# Patient Record
Sex: FEMALE | Race: WHITE | URBAN - METROPOLITAN AREA
[De-identification: names, ages, dates, MRNs, and addresses within clinical notes are randomized per-mention and may not be internally consistent; named-entity substitution may affect disease eponyms.]

---

## 2021-02-25 ENCOUNTER — INPATIENT (INPATIENT)
Facility: HOSPITAL | Age: 56
LOS: 0 days | Discharge: HOME | DRG: 247 | End: 2021-02-26
Attending: FAMILY MEDICINE | Admitting: INTERNAL MEDICINE
Payer: COMMERCIAL

## 2021-02-25 VITALS
DIASTOLIC BLOOD PRESSURE: 88 MMHG | SYSTOLIC BLOOD PRESSURE: 155 MMHG | RESPIRATION RATE: 18 BRPM | WEIGHT: 250 LBS | HEIGHT: 63 IN | HEART RATE: 86 BPM | OXYGEN SATURATION: 98 % | TEMPERATURE: 99 F

## 2021-02-25 DIAGNOSIS — R07.9 CHEST PAIN, UNSPECIFIED: ICD-10-CM

## 2021-02-25 LAB
ALBUMIN SERPL ELPH-MCNC: 3.8 G/DL — SIGNIFICANT CHANGE UP (ref 3.3–5)
ALP SERPL-CCNC: 70 U/L — SIGNIFICANT CHANGE UP (ref 40–120)
ALT FLD-CCNC: 24 U/L — SIGNIFICANT CHANGE UP (ref 12–78)
ANION GAP SERPL CALC-SCNC: 7 MMOL/L — SIGNIFICANT CHANGE UP (ref 5–17)
APTT BLD: 35.5 SEC — SIGNIFICANT CHANGE UP (ref 27.5–35.5)
AST SERPL-CCNC: 15 U/L — SIGNIFICANT CHANGE UP (ref 15–37)
BASOPHILS # BLD AUTO: 0.08 K/UL — SIGNIFICANT CHANGE UP (ref 0–0.2)
BASOPHILS NFR BLD AUTO: 0.8 % — SIGNIFICANT CHANGE UP (ref 0–2)
BILIRUB SERPL-MCNC: 0.4 MG/DL — SIGNIFICANT CHANGE UP (ref 0.2–1.2)
BUN SERPL-MCNC: 13 MG/DL — SIGNIFICANT CHANGE UP (ref 7–23)
CALCIUM SERPL-MCNC: 9.7 MG/DL — SIGNIFICANT CHANGE UP (ref 8.5–10.1)
CHLORIDE SERPL-SCNC: 104 MMOL/L — SIGNIFICANT CHANGE UP (ref 96–108)
CO2 SERPL-SCNC: 27 MMOL/L — SIGNIFICANT CHANGE UP (ref 22–31)
CREAT SERPL-MCNC: 1 MG/DL — SIGNIFICANT CHANGE UP (ref 0.5–1.3)
EOSINOPHIL # BLD AUTO: 0.26 K/UL — SIGNIFICANT CHANGE UP (ref 0–0.5)
EOSINOPHIL NFR BLD AUTO: 2.7 % — SIGNIFICANT CHANGE UP (ref 0–6)
GLUCOSE SERPL-MCNC: 157 MG/DL — HIGH (ref 70–99)
HCT VFR BLD CALC: 46.4 % — HIGH (ref 34.5–45)
HGB BLD-MCNC: 15.2 G/DL — SIGNIFICANT CHANGE UP (ref 11.5–15.5)
IMM GRANULOCYTES NFR BLD AUTO: 0.2 % — SIGNIFICANT CHANGE UP (ref 0–1.5)
INR BLD: 1.07 RATIO — SIGNIFICANT CHANGE UP (ref 0.88–1.16)
LYMPHOCYTES # BLD AUTO: 2.26 K/UL — SIGNIFICANT CHANGE UP (ref 1–3.3)
LYMPHOCYTES # BLD AUTO: 23.4 % — SIGNIFICANT CHANGE UP (ref 13–44)
MCHC RBC-ENTMCNC: 28.3 PG — SIGNIFICANT CHANGE UP (ref 27–34)
MCHC RBC-ENTMCNC: 32.8 GM/DL — SIGNIFICANT CHANGE UP (ref 32–36)
MCV RBC AUTO: 86.2 FL — SIGNIFICANT CHANGE UP (ref 80–100)
MONOCYTES # BLD AUTO: 0.66 K/UL — SIGNIFICANT CHANGE UP (ref 0–0.9)
MONOCYTES NFR BLD AUTO: 6.8 % — SIGNIFICANT CHANGE UP (ref 2–14)
NEUTROPHILS # BLD AUTO: 6.38 K/UL — SIGNIFICANT CHANGE UP (ref 1.8–7.4)
NEUTROPHILS NFR BLD AUTO: 66.1 % — SIGNIFICANT CHANGE UP (ref 43–77)
PLATELET # BLD AUTO: 286 K/UL — SIGNIFICANT CHANGE UP (ref 150–400)
POTASSIUM SERPL-MCNC: 4.4 MMOL/L — SIGNIFICANT CHANGE UP (ref 3.5–5.3)
POTASSIUM SERPL-SCNC: 4.4 MMOL/L — SIGNIFICANT CHANGE UP (ref 3.5–5.3)
PROT SERPL-MCNC: 7.7 GM/DL — SIGNIFICANT CHANGE UP (ref 6–8.3)
PROTHROM AB SERPL-ACNC: 12.5 SEC — SIGNIFICANT CHANGE UP (ref 10.6–13.6)
RBC # BLD: 5.38 M/UL — HIGH (ref 3.8–5.2)
RBC # FLD: 13.7 % — SIGNIFICANT CHANGE UP (ref 10.3–14.5)
SARS-COV-2 RNA SPEC QL NAA+PROBE: SIGNIFICANT CHANGE UP
SODIUM SERPL-SCNC: 138 MMOL/L — SIGNIFICANT CHANGE UP (ref 135–145)
TROPONIN I SERPL-MCNC: 0.08 NG/ML — HIGH (ref 0.01–0.04)
WBC # BLD: 9.66 K/UL — SIGNIFICANT CHANGE UP (ref 3.8–10.5)
WBC # FLD AUTO: 9.66 K/UL — SIGNIFICANT CHANGE UP (ref 3.8–10.5)

## 2021-02-25 PROCEDURE — 82962 GLUCOSE BLOOD TEST: CPT

## 2021-02-25 PROCEDURE — C1894: CPT

## 2021-02-25 PROCEDURE — 99223 1ST HOSP IP/OBS HIGH 75: CPT

## 2021-02-25 PROCEDURE — 93005 ELECTROCARDIOGRAM TRACING: CPT

## 2021-02-25 PROCEDURE — 99153 MOD SED SAME PHYS/QHP EA: CPT

## 2021-02-25 PROCEDURE — 93010 ELECTROCARDIOGRAM REPORT: CPT | Mod: 76

## 2021-02-25 PROCEDURE — 80048 BASIC METABOLIC PNL TOTAL CA: CPT

## 2021-02-25 PROCEDURE — 71045 X-RAY EXAM CHEST 1 VIEW: CPT | Mod: 26

## 2021-02-25 PROCEDURE — 71045 X-RAY EXAM CHEST 1 VIEW: CPT

## 2021-02-25 PROCEDURE — C1725: CPT

## 2021-02-25 PROCEDURE — C1769: CPT

## 2021-02-25 PROCEDURE — C1874: CPT

## 2021-02-25 PROCEDURE — 86850 RBC ANTIBODY SCREEN: CPT

## 2021-02-25 PROCEDURE — 93306 TTE W/DOPPLER COMPLETE: CPT

## 2021-02-25 PROCEDURE — 36415 COLL VENOUS BLD VENIPUNCTURE: CPT

## 2021-02-25 PROCEDURE — 85025 COMPLETE CBC W/AUTO DIFF WBC: CPT

## 2021-02-25 PROCEDURE — 80061 LIPID PANEL: CPT

## 2021-02-25 PROCEDURE — 90686 IIV4 VACC NO PRSV 0.5 ML IM: CPT

## 2021-02-25 PROCEDURE — 86901 BLOOD TYPING SEROLOGIC RH(D): CPT

## 2021-02-25 PROCEDURE — 86900 BLOOD TYPING SEROLOGIC ABO: CPT

## 2021-02-25 PROCEDURE — 99152 MOD SED SAME PHYS/QHP 5/>YRS: CPT

## 2021-02-25 PROCEDURE — 86803 HEPATITIS C AB TEST: CPT

## 2021-02-25 PROCEDURE — C1887: CPT

## 2021-02-25 PROCEDURE — C1760: CPT

## 2021-02-25 PROCEDURE — G0008: CPT

## 2021-02-25 PROCEDURE — 83036 HEMOGLOBIN GLYCOSYLATED A1C: CPT

## 2021-02-25 RX ORDER — ATORVASTATIN CALCIUM 80 MG/1
20 TABLET, FILM COATED ORAL AT BEDTIME
Refills: 0 | Status: DISCONTINUED | OUTPATIENT
Start: 2021-02-25 | End: 2021-02-25

## 2021-02-25 RX ORDER — ONDANSETRON 8 MG/1
4 TABLET, FILM COATED ORAL EVERY 6 HOURS
Refills: 0 | Status: DISCONTINUED | OUTPATIENT
Start: 2021-02-25 | End: 2021-02-26

## 2021-02-25 RX ORDER — METOPROLOL TARTRATE 50 MG
25 TABLET ORAL DAILY
Refills: 0 | Status: DISCONTINUED | OUTPATIENT
Start: 2021-02-25 | End: 2021-02-26

## 2021-02-25 RX ORDER — ATORVASTATIN CALCIUM 80 MG/1
1 TABLET, FILM COATED ORAL
Qty: 0 | Refills: 0 | DISCHARGE

## 2021-02-25 RX ORDER — LANOLIN ALCOHOL/MO/W.PET/CERES
3 CREAM (GRAM) TOPICAL ONCE
Refills: 0 | Status: COMPLETED | OUTPATIENT
Start: 2021-02-25 | End: 2021-02-25

## 2021-02-25 RX ORDER — INSULIN LISPRO 100/ML
VIAL (ML) SUBCUTANEOUS
Refills: 0 | Status: DISCONTINUED | OUTPATIENT
Start: 2021-02-25 | End: 2021-02-26

## 2021-02-25 RX ORDER — DEXTROSE 50 % IN WATER 50 %
25 SYRINGE (ML) INTRAVENOUS ONCE
Refills: 0 | Status: DISCONTINUED | OUTPATIENT
Start: 2021-02-25 | End: 2021-02-26

## 2021-02-25 RX ORDER — DEXTROSE 50 % IN WATER 50 %
12.5 SYRINGE (ML) INTRAVENOUS ONCE
Refills: 0 | Status: DISCONTINUED | OUTPATIENT
Start: 2021-02-25 | End: 2021-02-26

## 2021-02-25 RX ORDER — CLOPIDOGREL BISULFATE 75 MG/1
75 TABLET, FILM COATED ORAL DAILY
Refills: 0 | Status: DISCONTINUED | OUTPATIENT
Start: 2021-02-26 | End: 2021-02-26

## 2021-02-25 RX ORDER — SODIUM CHLORIDE 9 MG/ML
1000 INJECTION INTRAMUSCULAR; INTRAVENOUS; SUBCUTANEOUS
Refills: 0 | Status: DISCONTINUED | OUTPATIENT
Start: 2021-02-25 | End: 2021-02-26

## 2021-02-25 RX ORDER — INFLUENZA VIRUS VACCINE 15; 15; 15; 15 UG/.5ML; UG/.5ML; UG/.5ML; UG/.5ML
0.5 SUSPENSION INTRAMUSCULAR ONCE
Refills: 0 | Status: DISCONTINUED | OUTPATIENT
Start: 2021-02-25 | End: 2021-02-26

## 2021-02-25 RX ORDER — NITROGLYCERIN 6.5 MG
0.4 CAPSULE, EXTENDED RELEASE ORAL
Refills: 0 | Status: DISCONTINUED | OUTPATIENT
Start: 2021-02-25 | End: 2021-02-25

## 2021-02-25 RX ORDER — ACETAMINOPHEN 500 MG
650 TABLET ORAL EVERY 6 HOURS
Refills: 0 | Status: DISCONTINUED | OUTPATIENT
Start: 2021-02-25 | End: 2021-02-26

## 2021-02-25 RX ORDER — ENOXAPARIN SODIUM 100 MG/ML
40 INJECTION SUBCUTANEOUS EVERY 12 HOURS
Refills: 0 | Status: DISCONTINUED | OUTPATIENT
Start: 2021-02-25 | End: 2021-02-26

## 2021-02-25 RX ORDER — ASPIRIN/CALCIUM CARB/MAGNESIUM 324 MG
324 TABLET ORAL ONCE
Refills: 0 | Status: COMPLETED | OUTPATIENT
Start: 2021-02-25 | End: 2021-02-25

## 2021-02-25 RX ORDER — METOPROLOL TARTRATE 50 MG
1 TABLET ORAL
Qty: 0 | Refills: 0 | DISCHARGE

## 2021-02-25 RX ORDER — SODIUM CHLORIDE 9 MG/ML
1000 INJECTION, SOLUTION INTRAVENOUS
Refills: 0 | Status: DISCONTINUED | OUTPATIENT
Start: 2021-02-25 | End: 2021-02-26

## 2021-02-25 RX ORDER — DEXTROSE 50 % IN WATER 50 %
15 SYRINGE (ML) INTRAVENOUS ONCE
Refills: 0 | Status: DISCONTINUED | OUTPATIENT
Start: 2021-02-25 | End: 2021-02-26

## 2021-02-25 RX ORDER — GLUCAGON INJECTION, SOLUTION 0.5 MG/.1ML
1 INJECTION, SOLUTION SUBCUTANEOUS ONCE
Refills: 0 | Status: DISCONTINUED | OUTPATIENT
Start: 2021-02-25 | End: 2021-02-26

## 2021-02-25 RX ORDER — ATORVASTATIN CALCIUM 80 MG/1
80 TABLET, FILM COATED ORAL AT BEDTIME
Refills: 0 | Status: DISCONTINUED | OUTPATIENT
Start: 2021-02-25 | End: 2021-02-26

## 2021-02-25 RX ORDER — ASPIRIN/CALCIUM CARB/MAGNESIUM 324 MG
81 TABLET ORAL DAILY
Refills: 0 | Status: DISCONTINUED | OUTPATIENT
Start: 2021-02-26 | End: 2021-02-26

## 2021-02-25 RX ORDER — NICOTINE POLACRILEX 2 MG
1 GUM BUCCAL DAILY
Refills: 0 | Status: DISCONTINUED | OUTPATIENT
Start: 2021-02-25 | End: 2021-02-26

## 2021-02-25 RX ORDER — INSULIN LISPRO 100/ML
VIAL (ML) SUBCUTANEOUS AT BEDTIME
Refills: 0 | Status: DISCONTINUED | OUTPATIENT
Start: 2021-02-25 | End: 2021-02-26

## 2021-02-25 RX ADMIN — Medication 25 MILLIGRAM(S): at 17:11

## 2021-02-25 RX ADMIN — Medication 3 MILLIGRAM(S): at 23:05

## 2021-02-25 RX ADMIN — Medication 324 MILLIGRAM(S): at 08:17

## 2021-02-25 RX ADMIN — ENOXAPARIN SODIUM 40 MILLIGRAM(S): 100 INJECTION SUBCUTANEOUS at 22:29

## 2021-02-25 RX ADMIN — Medication 1 PATCH: at 10:50

## 2021-02-25 RX ADMIN — ATORVASTATIN CALCIUM 80 MILLIGRAM(S): 80 TABLET, FILM COATED ORAL at 22:29

## 2021-02-25 NOTE — ED ADULT TRIAGE NOTE - CHIEF COMPLAINT QUOTE
Pt arrives to ED complaining of chest pain starting this morning. pt had recent +stress test on Monday and is scheduled for angiogram next week. denies medical history.

## 2021-02-25 NOTE — ED ADULT NURSE NOTE - OBJECTIVE STATEMENT
Patient states she had a positive stress test and was scheduled for an angiogram in a few weeks. Patient states she started having chest pain this morning and was told to come to the hospital. Since being here pain has subsided and she says she does need NTG. Patient color good.

## 2021-02-25 NOTE — ED PROVIDER NOTE - PROGRESS NOTE DETAILS
VICENTA/W Emily -- admit hospitalist, he will see later this am No chest pain at present, will hold NTG.

## 2021-02-25 NOTE — H&P ADULT - HISTORY OF PRESENT ILLNESS
54 y/o female with PMHX of HTN, HLD, obesity, and long standing tobacco abuse (1.5 PPD for the last 40 years) who presented to  with CC of chest pressure/ CP.  As per patient, she saw her PMD for a physical recently.  She had an abnormal EKG and was referred to cardiology.  Cardiology did a stress test last week which was abnormal and patient was being set up for Cardiac Cath.  Today, patient started having chest heaviness/ CP and was told to come to  ER for evaluation.  Pt does admit to SOB more chronically; however, she has always attributed to her smoking history.  She has very low exercise tolerance due to SOB.  In the ER, initial trop was 0.08.  EKG with T wave inversions I, avL, V5,V6 concerning for lateral ischemia.  Patient take to CATH LAB by Dr. Diaz.  In cath, patient found to have significant occlusion of LAD s/p stenting.  Patient transferred to CICU.  Patient also with some disease in RCA.        PAST MEDICAL & SURGICAL HISTORY:  HTN  HLD  Obesity  Tob use      FAMILY HISTORY:  +Premature CAD in father:  CAD in 50-60's.      Social History:    +TOB:  1.5 PPD for 40 years.  60 pack year hx.    No etoh/ drug use.      Allergies:  No Known Allergies    MEDICATIONS  (STANDING):  atorvastatin 80 milliGRAM(s) Oral at bedtime  dextrose 40% Gel 15 Gram(s) Oral once  dextrose 5%. 1000 milliLiter(s) (50 mL/Hr) IV Continuous <Continuous>  dextrose 5%. 1000 milliLiter(s) (100 mL/Hr) IV Continuous <Continuous>  dextrose 50% Injectable 25 Gram(s) IV Push once  dextrose 50% Injectable 12.5 Gram(s) IV Push once  dextrose 50% Injectable 25 Gram(s) IV Push once  enoxaparin Injectable 40 milliGRAM(s) SubCutaneous every 12 hours  glucagon  Injectable 1 milliGRAM(s) IntraMuscular once  insulin lispro (ADMELOG) corrective regimen sliding scale   SubCutaneous three times a day before meals  insulin lispro (ADMELOG) corrective regimen sliding scale   SubCutaneous at bedtime  metoprolol succinate ER 25 milliGRAM(s) Oral daily  nicotine - 21 mG/24Hr(s) Patch 1 patch Transdermal daily  sodium chloride 0.9%. 1000 milliLiter(s) (75 mL/Hr) IV Continuous <Continuous>    MEDICATIONS  (PRN):  acetaminophen   Tablet .. 650 milliGRAM(s) Oral every 6 hours PRN Mild Pain (1 - 3)  aluminum hydroxide/magnesium hydroxide/simethicone Suspension 30 milliLiter(s) Oral every 4 hours PRN Dyspepsia  ondansetron Injectable 4 milliGRAM(s) IV Push every 6 hours PRN Nausea

## 2021-02-25 NOTE — H&P ADULT - NSHPLABSRESULTS_GEN_ALL_CORE
15.2   9.66  )-----------( 286      ( 25 Feb 2021 08:02 )             46.4     02-25    138  |  104  |  13  ----------------------------<  157<H>  4.4   |  27  |  1.00    Ca    9.7      25 Feb 2021 08:02    TPro  7.7  /  Alb  3.8  /  TBili  0.4  /  DBili  x   /  AST  15  /  ALT  24  /  AlkPhos  70  02-25    CAPILLARY BLOOD GLUCOSE        PT/INR - ( 25 Feb 2021 08:02 )   PT: 12.5 sec;   INR: 1.07 ratio         PTT - ( 25 Feb 2021 08:02 )  PTT:35.5 sec

## 2021-02-25 NOTE — PACU DISCHARGE NOTE - COMMENTS
Report given to Mark Anthony Sauceda, CICU. pt transported on moniter to floor. V/S stable, Painfree at present.

## 2021-02-25 NOTE — CHART NOTE - NSCHARTNOTEFT_GEN_A_CORE
Risks and benefits of procedure explained. Informed consent signed by pt    ASA:II  Bleeding  Risk score:1.7  Creatinine:1  GFR:1.7%
s/p PCI of LAD  Denies CP, SOB, palpitation. Right femoral site soft nontender; no bleeding or hematoma
Nurse Practitioner Progress note:     HPI:   55F without PMH here with c/o chest pain -- reportedly had recent positive stress test, and is schedule for f/u cardiac cath.  This am developed chest pain -> to ED.    T(C): 36.9 (02-25-21 @ 10:32), Max: 37.2 (02-25-21 @ 07:29)  HR: 60 (02-25-21 @ 12:15) (59 - 86)  BP: 110/68 (02-25-21 @ 12:15) (110/68 - 155/88)  RR: 16 (02-25-21 @ 12:15) (13 - 18)  SpO2: 98% (02-25-21 @ 12:15) (93% - 98%)  Wt(kg): --    PHYSICAL EXAM:  Neurologic: Non-focal, AxOx3.  No neuro deficits  Vascular: Peripheral pulses palpable 2+ bilaterally  Procedure Site: Rt. femoral mynxx closure device site benign soft no bleeding no hematoma +1PP    LABS:	 	                        15.2   9.66  )-----------( 286      ( 25 Feb 2021 08:02 )             46.4   02-25    138  |  104  |  13  ----------------------------<  157<H>  4.4   |  27  |  1.00    Ca    9.7      25 Feb 2021 08:02    TPro  7.7  /  Alb  3.8  /  TBili  0.4  /  DBili  x   /  AST  15  /  ALT  24  /  AlkPhos  70  02-25        PROCEDURE RESULTS:    ASSESSMENT/PLAN: 	  -Admit to CICU  -VS, labs, diet, activity as per PCI orders  -IV hydration  -Encourage PO fluids  -ASA   -Plavix 75mg  -Lisinopril  -Toprol XL  -Crestor 10mg   -Plan of care D/W pt. and MD  -Discussed therapeutic lifestyle changes to reduce risk factors such as following a cardiac diet, weight loss, maintaining a healthy weight, exercise, smoking cessation, medication compliance, and regular follow-up  with MD to know our numbers (BP, cholesterol, weight, and glucose  -If pt. remains stable overnight possible D/C in AM  - Follow-up AM labs/EKG/site check  -Follow-up with attending

## 2021-02-25 NOTE — H&P ADULT - NSHPREVIEWOFSYSTEMS_GEN_ALL_CORE
ROS:  General:  No fevers, chills, or unexplained weight loss  Skin: No rash or bothersome skin lesions  Musculoskeletal: No arthalgias, myalgias or joint swelling  Eyes: No visual changes or eye pain  Ears: No hearing loss , otorrhea or ear pain  Nose, Mouth, Throat: No nasal congestion, rhinorrhea, oral lesions, postnasal drip or sore throat  Cardio: CP as above  Respiratory: SOB  GI: No diarrhea, constipation, blood in stools, abdominal pain, vomiting or heartburn  : No urinary frequency, hematuria, incontinence, or dysuria  Neurologic: No headaches, parasthesias, confusion, dysarthria or gait instability  Psychiatric:  No anxiety or depression  Lymphatic:  No easy bruising, easy bleeding or swollen glands  Allergic: No itching, sneezing , watery eyes, clear rhinorrhea or recurrent infections

## 2021-02-25 NOTE — H&P ADULT - NSHPPHYSICALEXAM_GEN_ALL_CORE
PEx  T(C): 36.9 (02-25-21 @ 15:44), Max: 37.2 (02-25-21 @ 07:29)  HR: 76 (02-25-21 @ 14:25) (57 - 86)  BP: 146/70 (02-25-21 @ 14:25) (110/68 - 155/88)  RR: 16 (02-25-21 @ 14:25) (13 - 18)  SpO2: 98% (02-25-21 @ 14:25) (93% - 98%)  Wt(kg): --  General:   obese.  NAD.    Skin: no rash or prominent lesions  Head: normocephalic, atraumatic     Sinuses: non-tender  Nose: no external lesions, mucosa non-inflamed, septum and turbinates normal  Throat: no erythema, exudates or lesions.  Neck: Supple without lymphadenopathy. Thyroid no thyromegaly, no palpable thyroid nodules, no palpable nodules or masses, carotid arteries no bruits.   Breasts: No palpable masses or lesions.  Heart: RRR, no murmur or gallop.  Normal S1, S2.  No S3, S4.   Lungs: CTA bilaterally, no wheezes, rhonchi, rales.  Breathing unlabored.   Chest wall: Normal insp   Abdomen:  Soft, NT/ND, normal bowel sounds, no HSM, no masses.  No peritoneal signs.   Back: spine normal without deformity or tenderness.  Normal ROM   : Exam normal.  no inguinal hernias.  Extremities: No deformities, clubbing, cyanosis, or edema.  Musculoskeletal: Normal gait and station. No decreased range of motion, instability, atrophy or abnormal strength or tone in the head, neck, spine, ribs, pelvis or extremities.   Neurologic: CN 2-12 normal. Sensation to pain, touch and proprioception normal. DTRs normal in upper and lower extremities. No pathologic reflexes.  Motor normal.  Psychiatric: Oriented X3, intact recent and remote memory, judgement and insight, normal mood and affect.

## 2021-02-25 NOTE — H&P ADULT - ASSESSMENT
56 y/o female with PMHX of HTN, HLD, obesity, and long standing tobacco abuse (1.5 PPD for the last 40 years) who presented to  with CC of chest pressure/ CP.  Patient had a recent outpatient STRESS TEST which was positive.  In the ER, initial trop was 0.08.  EKG with T wave inversions I, avL, V5,V6 concerning for lateral ischemia.  Patient take to CATH LAB by Dr. Diaz.  In cath, patient found to have significant occlusion of LAD s/p stenting.  Patient transferred to CICU.  Patient also with some disease in RCA.      #Unstable Angina/ ACS:    Pt s/p Cardiac cath with PCI to LAD.    As per notes, also with disease in RCA.    ASA/ Plavix.    Increase statin to lipitor 80.    Cont BB.    Check ECHO.    Cardio f/u.      #Tobacco Abuse:    Pt with ~60 pack year smoking hx.    Nicoderm patch.    Smoking cessation education.      #Hyperglycemia:    .    Check A1C.    Sliding scale insulin/ BGMs achs.      #Obesity:    Nutrtion eval.      #HTN:    Cont BB.      #HLD:    Statin increased.  Check lipids.     #DVT Proph:  Lovenox.

## 2021-02-25 NOTE — ED PROVIDER NOTE - OBJECTIVE STATEMENT
55F without PMH here c/o chest pain -- reportedly had recent positive stress test, and is schedule for f/u cardiac cath.  This am developed chest pain -> to ED. 55F without PMH here c/o chest pain -- reportedly had recent positive stress test, and is schedule for f/u cardiac cath.  This am developed chest pain -> to ED.  Pain = "pressure", "constant" since this am.  No diaphoresis. No dyspnea.  Cards: Chau/Emily

## 2021-02-26 VITALS
SYSTOLIC BLOOD PRESSURE: 116 MMHG | HEART RATE: 71 BPM | OXYGEN SATURATION: 100 % | DIASTOLIC BLOOD PRESSURE: 65 MMHG | RESPIRATION RATE: 15 BRPM

## 2021-02-26 LAB
A1C WITH ESTIMATED AVERAGE GLUCOSE RESULT: 7.9 % — HIGH (ref 4–5.6)
ANION GAP SERPL CALC-SCNC: 7 MMOL/L — SIGNIFICANT CHANGE UP (ref 5–17)
BASOPHILS # BLD AUTO: 0.05 K/UL — SIGNIFICANT CHANGE UP (ref 0–0.2)
BASOPHILS NFR BLD AUTO: 0.5 % — SIGNIFICANT CHANGE UP (ref 0–2)
BUN SERPL-MCNC: 11 MG/DL — SIGNIFICANT CHANGE UP (ref 7–23)
CALCIUM SERPL-MCNC: 9.1 MG/DL — SIGNIFICANT CHANGE UP (ref 8.5–10.1)
CHLORIDE SERPL-SCNC: 107 MMOL/L — SIGNIFICANT CHANGE UP (ref 96–108)
CHOLEST SERPL-MCNC: 134 MG/DL — SIGNIFICANT CHANGE UP
CO2 SERPL-SCNC: 26 MMOL/L — SIGNIFICANT CHANGE UP (ref 22–31)
CREAT SERPL-MCNC: 0.88 MG/DL — SIGNIFICANT CHANGE UP (ref 0.5–1.3)
EOSINOPHIL # BLD AUTO: 0.3 K/UL — SIGNIFICANT CHANGE UP (ref 0–0.5)
EOSINOPHIL NFR BLD AUTO: 2.9 % — SIGNIFICANT CHANGE UP (ref 0–6)
ESTIMATED AVERAGE GLUCOSE: 180 MG/DL — HIGH (ref 68–114)
GLUCOSE SERPL-MCNC: 127 MG/DL — HIGH (ref 70–99)
HCT VFR BLD CALC: 44.4 % — SIGNIFICANT CHANGE UP (ref 34.5–45)
HCV AB S/CO SERPL IA: 0.07 S/CO — SIGNIFICANT CHANGE UP (ref 0–0.99)
HCV AB SERPL-IMP: SIGNIFICANT CHANGE UP
HDLC SERPL-MCNC: 34 MG/DL — LOW
HGB BLD-MCNC: 14.3 G/DL — SIGNIFICANT CHANGE UP (ref 11.5–15.5)
IMM GRANULOCYTES NFR BLD AUTO: 0.4 % — SIGNIFICANT CHANGE UP (ref 0–1.5)
LIPID PNL WITH DIRECT LDL SERPL: 77 MG/DL — SIGNIFICANT CHANGE UP
LYMPHOCYTES # BLD AUTO: 2.95 K/UL — SIGNIFICANT CHANGE UP (ref 1–3.3)
LYMPHOCYTES # BLD AUTO: 28.4 % — SIGNIFICANT CHANGE UP (ref 13–44)
MCHC RBC-ENTMCNC: 28.1 PG — SIGNIFICANT CHANGE UP (ref 27–34)
MCHC RBC-ENTMCNC: 32.2 GM/DL — SIGNIFICANT CHANGE UP (ref 32–36)
MCV RBC AUTO: 87.2 FL — SIGNIFICANT CHANGE UP (ref 80–100)
MONOCYTES # BLD AUTO: 0.74 K/UL — SIGNIFICANT CHANGE UP (ref 0–0.9)
MONOCYTES NFR BLD AUTO: 7.1 % — SIGNIFICANT CHANGE UP (ref 2–14)
NEUTROPHILS # BLD AUTO: 6.31 K/UL — SIGNIFICANT CHANGE UP (ref 1.8–7.4)
NEUTROPHILS NFR BLD AUTO: 60.7 % — SIGNIFICANT CHANGE UP (ref 43–77)
NON HDL CHOLESTEROL: 100 MG/DL — SIGNIFICANT CHANGE UP
PLATELET # BLD AUTO: 257 K/UL — SIGNIFICANT CHANGE UP (ref 150–400)
POTASSIUM SERPL-MCNC: 4.1 MMOL/L — SIGNIFICANT CHANGE UP (ref 3.5–5.3)
POTASSIUM SERPL-SCNC: 4.1 MMOL/L — SIGNIFICANT CHANGE UP (ref 3.5–5.3)
RBC # BLD: 5.09 M/UL — SIGNIFICANT CHANGE UP (ref 3.8–5.2)
RBC # FLD: 13.6 % — SIGNIFICANT CHANGE UP (ref 10.3–14.5)
SARS-COV-2 IGG SERPL QL IA: NEGATIVE — SIGNIFICANT CHANGE UP
SARS-COV-2 IGM SERPL IA-ACNC: <0.1 INDEX — SIGNIFICANT CHANGE UP
SODIUM SERPL-SCNC: 140 MMOL/L — SIGNIFICANT CHANGE UP (ref 135–145)
TRIGL SERPL-MCNC: 120 MG/DL — SIGNIFICANT CHANGE UP
WBC # BLD: 10.39 K/UL — SIGNIFICANT CHANGE UP (ref 3.8–10.5)
WBC # FLD AUTO: 10.39 K/UL — SIGNIFICANT CHANGE UP (ref 3.8–10.5)

## 2021-02-26 PROCEDURE — 93010 ELECTROCARDIOGRAM REPORT: CPT

## 2021-02-26 PROCEDURE — 99239 HOSP IP/OBS DSCHRG MGMT >30: CPT

## 2021-02-26 PROCEDURE — 93306 TTE W/DOPPLER COMPLETE: CPT | Mod: 26

## 2021-02-26 RX ORDER — CLOPIDOGREL BISULFATE 75 MG/1
1 TABLET, FILM COATED ORAL
Qty: 30 | Refills: 10
Start: 2021-02-26 | End: 2022-01-21

## 2021-02-26 RX ORDER — ASPIRIN/CALCIUM CARB/MAGNESIUM 324 MG
1 TABLET ORAL
Qty: 0 | Refills: 0 | DISCHARGE

## 2021-02-26 RX ORDER — ASPIRIN/CALCIUM CARB/MAGNESIUM 324 MG
1 TABLET ORAL
Qty: 30 | Refills: 5
Start: 2021-02-26 | End: 2021-08-24

## 2021-02-26 RX ORDER — NICOTINE POLACRILEX 2 MG
1 GUM BUCCAL
Qty: 30 | Refills: 1
Start: 2021-02-26 | End: 2021-04-26

## 2021-02-26 RX ADMIN — CLOPIDOGREL BISULFATE 75 MILLIGRAM(S): 75 TABLET, FILM COATED ORAL at 09:30

## 2021-02-26 RX ADMIN — Medication 1 PATCH: at 09:30

## 2021-02-26 RX ADMIN — Medication 81 MILLIGRAM(S): at 09:29

## 2021-02-26 RX ADMIN — Medication 25 MILLIGRAM(S): at 09:29

## 2021-02-26 NOTE — DISCHARGE NOTE PROVIDER - NSDCMRMEDTOKEN_GEN_ALL_CORE_FT
aspirin 325 mg oral tablet: 1 tab(s) orally once a day  atorvastatin 20 mg oral tablet: 1 tab(s) orally once a day  Metoprolol Succinate ER 25 mg oral tablet, extended release: 1 tab(s) orally once a day   aspirin 81 mg oral tablet, chewable: 1 tab(s) orally once a day x 30 days   atorvastatin 20 mg oral tablet: 1 tab(s) orally once a day  clopidogrel 75 mg oral tablet: 1 tab(s) orally once a day  Metoprolol Succinate ER 25 mg oral tablet, extended release: 1 tab(s) orally once a day  nicotine 21 mg/24 hr transdermal film, extended release: 1 mg/hr transdermal once a day

## 2021-02-26 NOTE — DISCHARGE NOTE PROVIDER - NSDCMRMEDTOKEN_GEN_ALL_CORE_FT
aspirin 325 mg oral tablet: 1 tab(s) orally once a day  atorvastatin 20 mg oral tablet: 1 tab(s) orally once a day  Metoprolol Succinate ER 25 mg oral tablet, extended release: 1 tab(s) orally once a day

## 2021-02-26 NOTE — DISCHARGE NOTE PROVIDER - CARE PROVIDER_API CALL
Hernan Ritchie (DO)  Cardiology  [address] [place] [address]  Phone: [phone]  Fax: [phone]  Follow Up Time:

## 2021-02-26 NOTE — DISCHARGE NOTE NURSING/CASE MANAGEMENT/SOCIAL WORK - PATIENT PORTAL LINK FT
You can access the FollowMyHealth Patient Portal offered by [facility] by registering at the following website: [email or web address]. By joining [facility]’s FollowMyHealth portal, you will also be able to view your health information using other applications (apps) compatible with our system.

## 2021-02-26 NOTE — DISCHARGE NOTE PROVIDER - HOSPITAL COURSE
HPI:  54 y/o female with PMHX of HTN, HLD, obesity, and long standing tobacco abuse (1.5 PPD for the last 40 years) who presented to  with CC of chest pressure/ CP.  As per patient, she saw her PMD for a physical recently.  She had an abnormal EKG and was referred to cardiology.  Cardiology did a stress test last week which was abnormal and patient was being set up for Cardiac Cath.  Today, patient started having chest heaviness/ CP and was told to come to  ER for evaluation.  Pt does admit to SOB more chronically; however, she has always attributed to her smoking history.  She has very low exercise tolerance due to SOB.  In the ER, initial trop was 0.08.  EKG with T wave inversions I, avL, V5,V6 concerning for lateral ischemia.  Patient take to CATH LAB by Dr. Diaz.  In cath, patient found to have significant occlusion of LAD s/p stenting.  Patient transferred to CICU.  Patient also with some disease in RCA.    PAST MEDICAL & SURGICAL HISTORY:  HTN  HLD  Obesity  Tob use      FAMILY HISTORY:  +Premature CAD in father:  CAD in 50-60's.      Social History:    +TOB:  1.5 PPD for 40 years.  60 pack year hx.    No etoh/ drug use.      Allergies:  No Known Allergies    MEDICATIONS  (STANDING):  atorvastatin 80 milliGRAM(s) Oral at bedtime  dextrose 40% Gel 15 Gram(s) Oral once  dextrose 5%. 1000 milliLiter(s) (50 mL/Hr) IV Continuous <Continuous>  dextrose 5%. 1000 milliLiter(s) (100 mL/Hr) IV Continuous <Continuous>  dextrose 50% Injectable 25 Gram(s) IV Push once  dextrose 50% Injectable 12.5 Gram(s) IV Push once  dextrose 50% Injectable 25 Gram(s) IV Push once  enoxaparin Injectable 40 milliGRAM(s) SubCutaneous every 12 hours  glucagon  Injectable 1 milliGRAM(s) IntraMuscular once  insulin lispro (ADMELOG) corrective regimen sliding scale   SubCutaneous three times a day before meals  insulin lispro (ADMELOG) corrective regimen sliding scale   SubCutaneous at bedtime  metoprolol succinate ER 25 milliGRAM(s) Oral daily  nicotine - 21 mG/24Hr(s) Patch 1 patch Transdermal daily  sodium chloride 0.9%. 1000 milliLiter(s) (75 mL/Hr) IV Continuous <Continuous>    MEDICATIONS  (PRN):  acetaminophen   Tablet .. 650 milliGRAM(s) Oral every 6 hours PRN Mild Pain (1 - 3)  aluminum hydroxide/magnesium hydroxide/simethicone Suspension 30 milliLiter(s) Oral every 4 hours PRN Dyspepsia  ondansetron Injectable 4 milliGRAM(s) IV Push every 6 hours PRN Nausea     (25 Feb 2021 16:12)        Subjective/Observations: Pt. seen and examined and evaluated. Pt. resting comfortably in bed in NAD, with no respiratory distress, no chest pain, dyspnea, palpitations, PND, or orthopnea.    REVIEW OF SYSTEMS: All other review of systems is negative unless indicated above    Allergies: No Known Allergies    Vital Signs Last 24 Hrs  T(C): 36.6 (26 Feb 2021 08:50), Max: 36.9 (25 Feb 2021 10:32)  T(F): 97.8 (26 Feb 2021 08:50), Max: 98.5 (25 Feb 2021 15:44)  HR: 66 (26 Feb 2021 05:00) (57 - 78)  BP: 120/63 (26 Feb 2021 05:00) (101/73 - 146/70)  BP(mean): 75 (26 Feb 2021 05:00) (72 - 96)  RR: 19 (26 Feb 2021 05:00) (12 - 22)  SpO2: 96% (25 Feb 2021 23:15) (95% - 99%)    I&O's Summary    25 Feb 2021 07:01  -  26 Feb 2021 07:00  --------------------------------------------------------  IN: 0 mL / OUT: 300 mL / NET: -300 mL      Weight (kg): 113.4 (02-25 @ 10:32)      LABS: All Labs Reviewed:                        14.3   10.39 )-----------( 257      ( 26 Feb 2021 06:13 )             44.4                 02-26    140  |  107  |  11  ----------------------------<  127<H>  4.1   |  26  |  0.88    Ca    9.1      26 Feb 2021 06:13    TPro  7.7  /  Alb  3.8  /  TBili  0.4  /  DBili  x   /  AST  15  /  ALT  24  /  AlkPhos  70  02-25   HPI:  54 y/o female with PMHX of HTN, HLD, obesity, and long standing tobacco abuse (1.5 PPD for the last 40 years) who presented to  with CC of chest pressure/ CP.  As per patient, she saw her PMD for a physical recently.  She had an abnormal EKG and was referred to cardiology.  Cardiology did a stress test last week which was abnormal and patient was being set up for Cardiac Cath.  Today, patient started having chest heaviness/ CP and was told to come to  ER for evaluation.  Pt does admit to SOB more chronically; however, she has always attributed to her smoking history.  She has very low exercise tolerance due to SOB.  In the ER, initial trop was 0.08.  EKG with T wave inversions I, avL, V5,V6 concerning for lateral ischemia.  Patient take to CATH LAB by Dr. Diaz.  In cath, patient found to have significant occlusion of LAD s/p stenting.  Patient transferred to CICU.  Patient also with some disease in RCA.    PAST MEDICAL & SURGICAL HISTORY:  HTN  HLD  Obesity  Tob use      FAMILY HISTORY:  +Premature CAD in father:  CAD in 50-60's.      Social History:    +TOB:  1.5 PPD for 40 years.  60 pack year hx.    No etoh/ drug use.      Allergies:  No Known Allergies    MEDICATIONS  (STANDING):  atorvastatin 80 milliGRAM(s) Oral at bedtime  dextrose 40% Gel 15 Gram(s) Oral once  dextrose 5%. 1000 milliLiter(s) (50 mL/Hr) IV Continuous <Continuous>  dextrose 5%. 1000 milliLiter(s) (100 mL/Hr) IV Continuous <Continuous>  dextrose 50% Injectable 25 Gram(s) IV Push once  dextrose 50% Injectable 12.5 Gram(s) IV Push once  dextrose 50% Injectable 25 Gram(s) IV Push once  enoxaparin Injectable 40 milliGRAM(s) SubCutaneous every 12 hours  glucagon  Injectable 1 milliGRAM(s) IntraMuscular once  insulin lispro (ADMELOG) corrective regimen sliding scale   SubCutaneous three times a day before meals  insulin lispro (ADMELOG) corrective regimen sliding scale   SubCutaneous at bedtime  metoprolol succinate ER 25 milliGRAM(s) Oral daily  nicotine - 21 mG/24Hr(s) Patch 1 patch Transdermal daily  sodium chloride 0.9%. 1000 milliLiter(s) (75 mL/Hr) IV Continuous <Continuous>    MEDICATIONS  (PRN):  acetaminophen   Tablet .. 650 milliGRAM(s) Oral every 6 hours PRN Mild Pain (1 - 3)  aluminum hydroxide/magnesium hydroxide/simethicone Suspension 30 milliLiter(s) Oral every 4 hours PRN Dyspepsia  ondansetron Injectable 4 milliGRAM(s) IV Push every 6 hours PRN Nausea     (25 Feb 2021 16:12)        Subjective/Observations: Pt. seen and examined and evaluated. Pt. resting comfortably in bed in NAD, with no respiratory distress, no chest pain, dyspnea, palpitations, PND, or orthopnea.    REVIEW OF SYSTEMS: All other review of systems is negative unless indicated above    Allergies: No Known Allergies    Vital Signs Last 24 Hrs  T(C): 36.6 (26 Feb 2021 08:50), Max: 36.9 (25 Feb 2021 10:32)  T(F): 97.8 (26 Feb 2021 08:50), Max: 98.5 (25 Feb 2021 15:44)  HR: 66 (26 Feb 2021 05:00) (57 - 78)  BP: 120/63 (26 Feb 2021 05:00) (101/73 - 146/70)  BP(mean): 75 (26 Feb 2021 05:00) (72 - 96)  RR: 19 (26 Feb 2021 05:00) (12 - 22)  SpO2: 96% (25 Feb 2021 23:15) (95% - 99%)    I&O's Summary    25 Feb 2021 07:01  -  26 Feb 2021 07:00  --------------------------------------------------------  IN: 0 mL / OUT: 300 mL / NET: -300 mL      Weight (kg): 113.4 (02-25 @ 10:32)      LABS: All Labs Reviewed:                        14.3   10.39 )-----------( 257      ( 26 Feb 2021 06:13 )             44.4                 02-26    140  |  107  |  11  ----------------------------<  127<H>  4.1   |  26  |  0.88    Ca    9.1      26 Feb 2021 06:13    TPro  7.7  /  Alb  3.8  /  TBili  0.4  /  DBili  x   /  AST  15  /  ALT  24  /  AlkPhos  70  02-25    Physical Exam:  Appearance: [ ] Normal  [ ] abnormal [X ] NAD   Eyes: [ ] PERRL [ ] EOMI  HEENT: [ ] Normal [ ] Abnormal oral mucosa [ ]NC/AT  Cardiovascular: [X ] S1 [ X] S2 [ ] RRR [ ] m/r/g [ ]edema [ ] JVP  Procedural Access Site: [X]  rt. groin benign soft no bleeding no hematoma +1PP  Respiratory: [X] Clear to auscultation bilaterally  Gastrointestinal: [ ] Soft [ ] tenderness[ ] distension [ ] BS  Musculoskeletal: [ ] clubbing [ ] joint deformity   Neurologic: [ ] Non-focal  Lymphatic: [ ] lymphadenopathy  Psychiatry: [X] AAOx3  [ ] confused [ ] disoriented [ ] Mood & affect appropriate  Skin: [ ]  rashes [ ] ecchymoses [ ] cyanosis   54 y/o female with PMHX of HTN, HLD, obesity, and long standing tobacco abuse (1.5 PPD for the last 40 years) who presented to  with CC of chest pressure/ CP.  As per patient, she saw her PMD for a physical recently.  She had an abnormal EKG and was referred to cardiology.  Cardiology did a stress test last week which was abnormal and patient was being set up for Cardiac Cath.  Today, patient started having chest heaviness/ CP and was told to come to  ER for evaluation.  Pt does admit to SOB more chronically; however, she has always attributed to her smoking history.  She has very low exercise tolerance due to SOB.  In the ER, initial trop was 0.08.  EKG with T wave inversions I, avL, V5,V6 concerning for lateral ischemia.  Patient taken to CATH LAB by Dr. Diaz.  In cath, patient found to have significant occlusion of LAD s/p stenting.  Patient transferred to CICU.  Patient also with some disease in RCA.    MEDICATIONS reviewed  Subjective/Observations: Pt. seen and examined and evaluated. Pt. resting comfortably in bed in NAD, with no respiratory distress, no chest pain, dyspnea, palpitations, PND, or orthopnea.  REVIEW OF SYSTEMS: All other review of systems is negative unless indicated above  Vital Signs Last 24 Hrs  T(C): 36.6 (26 Feb 2021 08:50), Max: 36.9 (25 Feb 2021 10:32)  T(F): 97.8 (26 Feb 2021 08:50), Max: 98.5 (25 Feb 2021 15:44)  HR: 66 (26 Feb 2021 05:00) (57 - 78)  BP: 120/63 (26 Feb 2021 05:00) (101/73 - 146/70)  BP(mean): 75 (26 Feb 2021 05:00) (72 - 96)  RR: 19 (26 Feb 2021 05:00) (12 - 22)  SpO2: 96% (25 Feb 2021 23:15) (95% - 99%)  I&O's Summary  25 Feb 2021 07:01  -  26 Feb 2021 07:00  --------------------------------------------------------  IN: 0 mL / OUT: 300 mL / NET: -300 mL    Weight (kg): 113.4 (02-25 @ 10:32)    LABS: All Labs Reviewed:                        14.3   10.39 )-----------( 257      ( 26 Feb 2021 06:13 )             44.4                 02-26    140  |  107  |  11  ----------------------------<  127<H>  4.1   |  26  |  0.88    Ca    9.1      26 Feb 2021 06:13    TPro  7.7  /  Alb  3.8  /  TBili  0.4  /  DBili  x   /  AST  15  /  ALT  24  /  AlkPhos  70  02-25    Physical Exam:  Appearance: [ ] Normal  [ ] abnormal [X ] NAD   Eyes: [ ] PERRL [ ] EOMI  HEENT: [ ] Normal [ ] Abnormal oral mucosa [ ]NC/AT  Cardiovascular: [X ] S1 [ X] S2 [ ] RRR [ ] m/r/g [ ]edema [ ] JVP  Procedural Access Site: [X]  rt. groin benign soft no bleeding no hematoma +1PP  Respiratory: [X] Clear to auscultation bilaterally  Gastrointestinal: [ ] Soft [ ] tenderness[ ] distension [ ] BS  Musculoskeletal: [ ] clubbing [ ] joint deformity   Neurologic: [ ] Non-focal  Lymphatic: [ ] lymphadenopathy  Psychiatry: [X] AAOx3  [ ] confused [ ] disoriented [ ] Mood & affect appropriate  Skin: [ ]  rashes [ ] ecchymoses [ ] cyanosis    Pt seen and examined with NP. A&P reviewed.   S/p elective PCI due to abnormal stress test with LAD and RCA dx and TONEY to LAD.  Cont medical management with DAPT, statin and BBL  Elective f/u for RCA lesion

## 2021-02-26 NOTE — DISCHARGE NOTE PROVIDER - HOSPITAL COURSE
NP Progress Note     HPI:  56 y/o female with PMHX of HTN, HLD, obesity, and long standing tobacco abuse (1.5 PPD for the last 40 years) who presented to  with CC of chest pressure/ CP.  As per patient, she saw her PMD for a physical recently.  She had an abnormal EKG and was referred to cardiology.  Cardiology did a stress test last week which was abnormal and patient was being set up for Cardiac Cath.  Today, patient started having chest heaviness/ CP and was told to come to  ER for evaluation.  Pt does admit to SOB more chronically; however, she has always attributed to her smoking history.  She has very low exercise tolerance due to SOB.  In the ER, initial trop was 0.08.  EKG with T wave inversions I, avL, V5,V6 concerning for lateral ischemia.  Patient take to CATH LAB by Dr. Diaz.  In cath, patient found to have significant occlusion of LAD s/p stenting.  Patient transferred to CICU.  Patient also with some disease in RCA.        PAST MEDICAL & SURGICAL HISTORY:  HTN  HLD  Obesity  Tob use      FAMILY HISTORY:  +Premature CAD in father:  CAD in 50-60's.      Social History:    +TOB:  1.5 PPD for 40 years.  60 pack year hx.    No etoh/ drug use.      Allergies:  No Known Allergies    MEDICATIONS  (STANDING):  atorvastatin 80 milliGRAM(s) Oral at bedtime  dextrose 40% Gel 15 Gram(s) Oral once  dextrose 5%. 1000 milliLiter(s) (50 mL/Hr) IV Continuous <Continuous>  dextrose 5%. 1000 milliLiter(s) (100 mL/Hr) IV Continuous <Continuous>  dextrose 50% Injectable 25 Gram(s) IV Push once  dextrose 50% Injectable 12.5 Gram(s) IV Push once  dextrose 50% Injectable 25 Gram(s) IV Push once  enoxaparin Injectable 40 milliGRAM(s) SubCutaneous every 12 hours  glucagon  Injectable 1 milliGRAM(s) IntraMuscular once  insulin lispro (ADMELOG) corrective regimen sliding scale   SubCutaneous three times a day before meals  insulin lispro (ADMELOG) corrective regimen sliding scale   SubCutaneous at bedtime  metoprolol succinate ER 25 milliGRAM(s) Oral daily  nicotine - 21 mG/24Hr(s) Patch 1 patch Transdermal daily  sodium chloride 0.9%. 1000 milliLiter(s) (75 mL/Hr) IV Continuous <Continuous>    MEDICATIONS  (PRN):  acetaminophen   Tablet .. 650 milliGRAM(s) Oral every 6 hours PRN Mild Pain (1 - 3)  aluminum hydroxide/magnesium hydroxide/simethicone Suspension 30 milliLiter(s) Oral every 4 hours PRN Dyspepsia  ondansetron Injectable 4 milliGRAM(s) IV Push every 6 hours PRN Nausea     (25 Feb 2021 16:12)        Subjective/Observations: Pt. seen and examined and evaluated. Pt. resting comfortably in bed in NAD, with no respiratory distress, no chest pain, dyspnea, palpitations, PND, or orthopnea.    REVIEW OF SYSTEMS: All other review of systems is negative unless indicated above    Allergies:  No Known Allergies    Intolerances    Vital Signs Last 24 Hrs  T(C): 36.5 (26 Feb 2021 05:54), Max: 37.2 (25 Feb 2021 07:29)  T(F): 97.7 (26 Feb 2021 05:54), Max: 99 (25 Feb 2021 07:29)  HR: 66 (26 Feb 2021 05:00) (57 - 86)  BP: 120/63 (26 Feb 2021 05:00) (101/73 - 155/88)  BP(mean): 75 (26 Feb 2021 05:00) (72 - 115)  RR: 19 (26 Feb 2021 05:00) (12 - 22)  SpO2: 96% (25 Feb 2021 23:15) (93% - 99%)    I&O's Summary    25 Feb 2021 07:01  -  26 Feb 2021 06:58  --------------------------------------------------------  IN: 0 mL / OUT: 300 mL / NET: -300 mL      Weight (kg): 113.4 (02-25 @ 10:32)      LABS: All Labs Reviewed:                        14.3   10.39 )-----------( 257      ( 26 Feb 2021 06:13 )             44.4           02-25    138  |  104  |  13  ----------------------------<  157<H>  4.4   |  27  |  1.00    Ca    9.7      25 Feb 2021 08:02    TPro  7.7  /  Alb  3.8  /  TBili  0.4  /  DBili  x   /  AST  15  /  ALT  24  /  AlkPhos  70  02-25

## 2021-03-04 DIAGNOSIS — Z82.49 FAMILY HISTORY OF ISCHEMIC HEART DISEASE AND OTHER DISEASES OF THE CIRCULATORY SYSTEM: ICD-10-CM

## 2021-03-04 DIAGNOSIS — Z79.82 LONG TERM (CURRENT) USE OF ASPIRIN: ICD-10-CM

## 2021-03-04 DIAGNOSIS — I10 ESSENTIAL (PRIMARY) HYPERTENSION: ICD-10-CM

## 2021-03-04 DIAGNOSIS — E66.9 OBESITY, UNSPECIFIED: ICD-10-CM

## 2021-03-04 DIAGNOSIS — I25.110 ATHEROSCLEROTIC HEART DISEASE OF NATIVE CORONARY ARTERY WITH UNSTABLE ANGINA PECTORIS: ICD-10-CM

## 2021-03-04 DIAGNOSIS — E78.5 HYPERLIPIDEMIA, UNSPECIFIED: ICD-10-CM

## 2021-03-04 DIAGNOSIS — R73.9 HYPERGLYCEMIA, UNSPECIFIED: ICD-10-CM

## 2021-03-04 DIAGNOSIS — F17.210 NICOTINE DEPENDENCE, CIGARETTES, UNCOMPLICATED: ICD-10-CM

## 2024-09-02 NOTE — ED PROVIDER NOTE - CROS ED ENMT ALL NEG
[Time Spent: ___ minutes] : I have spent [unfilled] minutes of time on the encounter which excludes teaching and separately reported services. negative...

## 2024-09-06 NOTE — ASU PREOP CHECKLIST - RESPIRATORY RATE (BREATHS/MIN)
Prior Authorization **APPROVED**    Authorization Effective Date: 6/5/2024  Authorization Expiration Date: 9/3/2025  Medication: HYDROXYZINE HCL 10 MG PO TABS  Approved Dose/Quantity: -  Reference #: Iraj: [id number] PA Case ID #: [id number]   Insurance Company: [facility] - Phone [phone] Fax [phone]  Expected CoPay: $ 2.32    CoPay Card Available: No    Foundation Assistance Needed: -  Which Pharmacy is filling the prescription (Not needed for infusion/clinic administered): [facility] - [place], [place] - [address]  Pharmacy Notified: Yes  Patient Notified: Yes  Comments:  Discharge pharmacy sent patient with supply while auth is pending. *Retroactively Billed*        Annabelle Maddox CPhT  Discharge Pharmacy Liaison  [facility] [place]  Pronouns: She/Her/Hers    Securely message with [employer], [employer] Secure Chat, or [employer]  Phone: [phone]  Fax: [phone]  [email or web address]   18

## 2024-10-29 NOTE — PHARMACOTHERAPY INTERVENTION NOTE - OUTCOME
Joann Goel has no insurance right now - she will have insurance starting January- would you be able to send in her meds?  Please advise  Thank you   accepted